# Patient Record
Sex: FEMALE | Race: OTHER | NOT HISPANIC OR LATINO | URBAN - METROPOLITAN AREA
[De-identification: names, ages, dates, MRNs, and addresses within clinical notes are randomized per-mention and may not be internally consistent; named-entity substitution may affect disease eponyms.]

---

## 2018-07-01 ENCOUNTER — OUTPATIENT (OUTPATIENT)
Dept: OUTPATIENT SERVICES | Facility: HOSPITAL | Age: 68
LOS: 1 days | End: 2018-07-01
Payer: MEDICARE

## 2018-07-10 DIAGNOSIS — Z71.89 OTHER SPECIFIED COUNSELING: ICD-10-CM

## 2018-08-07 ENCOUNTER — APPOINTMENT (OUTPATIENT)
Dept: OTOLARYNGOLOGY | Facility: CLINIC | Age: 68
End: 2018-08-07

## 2018-08-28 ENCOUNTER — APPOINTMENT (OUTPATIENT)
Dept: OTOLARYNGOLOGY | Facility: CLINIC | Age: 68
End: 2018-08-28

## 2019-01-27 ENCOUNTER — EMERGENCY (EMERGENCY)
Facility: HOSPITAL | Age: 69
LOS: 1 days | Discharge: HOME | End: 2019-01-27
Attending: EMERGENCY MEDICINE | Admitting: EMERGENCY MEDICINE
Payer: MEDICARE

## 2019-01-27 VITALS
RESPIRATION RATE: 20 BRPM | HEART RATE: 91 BPM | TEMPERATURE: 100 F | DIASTOLIC BLOOD PRESSURE: 88 MMHG | OXYGEN SATURATION: 98 % | SYSTOLIC BLOOD PRESSURE: 158 MMHG

## 2019-01-27 LAB
ANION GAP SERPL CALC-SCNC: 15 MMO/L — HIGH (ref 7–14)
BASOPHILS # BLD AUTO: 0.01 K/UL — SIGNIFICANT CHANGE UP (ref 0–0.2)
BASOPHILS NFR BLD AUTO: 0.2 % — SIGNIFICANT CHANGE UP (ref 0–2)
BUN SERPL-MCNC: 7 MG/DL — SIGNIFICANT CHANGE UP (ref 7–23)
CALCIUM SERPL-MCNC: 8.8 MG/DL — SIGNIFICANT CHANGE UP (ref 8.4–10.5)
CHLORIDE SERPL-SCNC: 97 MMOL/L — LOW (ref 98–107)
CO2 SERPL-SCNC: 23 MMOL/L — SIGNIFICANT CHANGE UP (ref 22–31)
CREAT SERPL-MCNC: 0.86 MG/DL — SIGNIFICANT CHANGE UP (ref 0.5–1.3)
EOSINOPHIL # BLD AUTO: 0 K/UL — SIGNIFICANT CHANGE UP (ref 0–0.5)
EOSINOPHIL NFR BLD AUTO: 0 % — SIGNIFICANT CHANGE UP (ref 0–6)
FLU A RESULT: DETECTED — HIGH
FLU A RESULT: DETECTED — HIGH
FLUAV AG NPH QL: DETECTED — HIGH
FLUBV AG NPH QL: NOT DETECTED — SIGNIFICANT CHANGE UP
GLUCOSE SERPL-MCNC: 168 MG/DL — HIGH (ref 70–99)
HBA1C BLD-MCNC: 6.2 % — HIGH (ref 4–5.6)
HCT VFR BLD CALC: 35.8 % — SIGNIFICANT CHANGE UP (ref 34.5–45)
HGB BLD-MCNC: 11.6 G/DL — SIGNIFICANT CHANGE UP (ref 11.5–15.5)
IMM GRANULOCYTES NFR BLD AUTO: 0.5 % — SIGNIFICANT CHANGE UP (ref 0–1.5)
LYMPHOCYTES # BLD AUTO: 0.33 K/UL — LOW (ref 1–3.3)
LYMPHOCYTES # BLD AUTO: 7.6 % — LOW (ref 13–44)
MCHC RBC-ENTMCNC: 28.2 PG — SIGNIFICANT CHANGE UP (ref 27–34)
MCHC RBC-ENTMCNC: 32.4 % — SIGNIFICANT CHANGE UP (ref 32–36)
MCV RBC AUTO: 86.9 FL — SIGNIFICANT CHANGE UP (ref 80–100)
MONOCYTES # BLD AUTO: 0.27 K/UL — SIGNIFICANT CHANGE UP (ref 0–0.9)
MONOCYTES NFR BLD AUTO: 6.2 % — SIGNIFICANT CHANGE UP (ref 2–14)
NEUTROPHILS # BLD AUTO: 3.71 K/UL — SIGNIFICANT CHANGE UP (ref 1.8–7.4)
NEUTROPHILS NFR BLD AUTO: 85.5 % — HIGH (ref 43–77)
NRBC # FLD: 0 K/UL — LOW (ref 25–125)
PLATELET # BLD AUTO: 163 K/UL — SIGNIFICANT CHANGE UP (ref 150–400)
PMV BLD: 10.4 FL — SIGNIFICANT CHANGE UP (ref 7–13)
POTASSIUM SERPL-MCNC: 4.1 MMOL/L — SIGNIFICANT CHANGE UP (ref 3.5–5.3)
POTASSIUM SERPL-SCNC: 4.1 MMOL/L — SIGNIFICANT CHANGE UP (ref 3.5–5.3)
RBC # BLD: 4.12 M/UL — SIGNIFICANT CHANGE UP (ref 3.8–5.2)
RBC # FLD: 13.9 % — SIGNIFICANT CHANGE UP (ref 10.3–14.5)
RSV RESULT: SIGNIFICANT CHANGE UP
RSV RNA RESP QL NAA+PROBE: SIGNIFICANT CHANGE UP
SODIUM SERPL-SCNC: 135 MMOL/L — SIGNIFICANT CHANGE UP (ref 135–145)
WBC # BLD: 4.34 K/UL — SIGNIFICANT CHANGE UP (ref 3.8–10.5)
WBC # FLD AUTO: 4.34 K/UL — SIGNIFICANT CHANGE UP (ref 3.8–10.5)

## 2019-01-27 PROCEDURE — 99220: CPT | Mod: GC

## 2019-01-27 PROCEDURE — 71046 X-RAY EXAM CHEST 2 VIEWS: CPT | Mod: 26

## 2019-01-27 RX ORDER — IPRATROPIUM/ALBUTEROL SULFATE 18-103MCG
3 AEROSOL WITH ADAPTER (GRAM) INHALATION
Qty: 0 | Refills: 0 | Status: COMPLETED | OUTPATIENT
Start: 2019-01-27 | End: 2019-01-27

## 2019-01-27 RX ORDER — DEXTROSE 50 % IN WATER 50 %
15 SYRINGE (ML) INTRAVENOUS ONCE
Qty: 0 | Refills: 0 | Status: DISCONTINUED | OUTPATIENT
Start: 2019-01-27 | End: 2019-01-31

## 2019-01-27 RX ORDER — SODIUM CHLORIDE 9 MG/ML
1000 INJECTION INTRAMUSCULAR; INTRAVENOUS; SUBCUTANEOUS
Qty: 0 | Refills: 0 | Status: DISCONTINUED | OUTPATIENT
Start: 2019-01-27 | End: 2019-01-31

## 2019-01-27 RX ORDER — DEXTROSE 50 % IN WATER 50 %
25 SYRINGE (ML) INTRAVENOUS ONCE
Qty: 0 | Refills: 0 | Status: DISCONTINUED | OUTPATIENT
Start: 2019-01-27 | End: 2019-01-31

## 2019-01-27 RX ORDER — IBUPROFEN 200 MG
600 TABLET ORAL EVERY 6 HOURS
Qty: 0 | Refills: 0 | Status: DISCONTINUED | OUTPATIENT
Start: 2019-01-27 | End: 2019-01-31

## 2019-01-27 RX ORDER — DEXTROSE 50 % IN WATER 50 %
12.5 SYRINGE (ML) INTRAVENOUS ONCE
Qty: 0 | Refills: 0 | Status: DISCONTINUED | OUTPATIENT
Start: 2019-01-27 | End: 2019-01-31

## 2019-01-27 RX ORDER — ATORVASTATIN CALCIUM 80 MG/1
80 TABLET, FILM COATED ORAL AT BEDTIME
Qty: 0 | Refills: 0 | Status: DISCONTINUED | OUTPATIENT
Start: 2019-01-27 | End: 2019-01-31

## 2019-01-27 RX ORDER — METOCLOPRAMIDE HCL 10 MG
10 TABLET ORAL ONCE
Qty: 0 | Refills: 0 | Status: COMPLETED | OUTPATIENT
Start: 2019-01-27 | End: 2019-01-27

## 2019-01-27 RX ORDER — SODIUM CHLORIDE 9 MG/ML
1000 INJECTION INTRAMUSCULAR; INTRAVENOUS; SUBCUTANEOUS ONCE
Qty: 0 | Refills: 0 | Status: COMPLETED | OUTPATIENT
Start: 2019-01-27 | End: 2019-01-27

## 2019-01-27 RX ORDER — SODIUM CHLORIDE 9 MG/ML
1000 INJECTION, SOLUTION INTRAVENOUS
Qty: 0 | Refills: 0 | Status: DISCONTINUED | OUTPATIENT
Start: 2019-01-27 | End: 2019-01-31

## 2019-01-27 RX ORDER — ONDANSETRON 8 MG/1
4 TABLET, FILM COATED ORAL ONCE
Qty: 0 | Refills: 0 | Status: COMPLETED | OUTPATIENT
Start: 2019-01-27 | End: 2019-01-28

## 2019-01-27 RX ORDER — LOSARTAN POTASSIUM 100 MG/1
100 TABLET, FILM COATED ORAL DAILY
Qty: 0 | Refills: 0 | Status: DISCONTINUED | OUTPATIENT
Start: 2019-01-27 | End: 2019-01-31

## 2019-01-27 RX ORDER — IPRATROPIUM/ALBUTEROL SULFATE 18-103MCG
3 AEROSOL WITH ADAPTER (GRAM) INHALATION EVERY 6 HOURS
Qty: 0 | Refills: 0 | Status: DISCONTINUED | OUTPATIENT
Start: 2019-01-27 | End: 2019-01-31

## 2019-01-27 RX ORDER — METFORMIN HYDROCHLORIDE 850 MG/1
500 TABLET ORAL
Qty: 0 | Refills: 0 | Status: DISCONTINUED | OUTPATIENT
Start: 2019-01-27 | End: 2019-01-31

## 2019-01-27 RX ORDER — ACETAMINOPHEN 500 MG
650 TABLET ORAL EVERY 6 HOURS
Qty: 0 | Refills: 0 | Status: DISCONTINUED | OUTPATIENT
Start: 2019-01-27 | End: 2019-01-31

## 2019-01-27 RX ORDER — PIOGLITAZONE HYDROCHLORIDE 15 MG/1
15 TABLET ORAL DAILY
Qty: 0 | Refills: 0 | Status: DISCONTINUED | OUTPATIENT
Start: 2019-01-27 | End: 2019-01-31

## 2019-01-27 RX ORDER — KETOROLAC TROMETHAMINE 30 MG/ML
15 SYRINGE (ML) INJECTION ONCE
Qty: 0 | Refills: 0 | Status: DISCONTINUED | OUTPATIENT
Start: 2019-01-27 | End: 2019-01-27

## 2019-01-27 RX ORDER — INSULIN GLARGINE 100 [IU]/ML
17 INJECTION, SOLUTION SUBCUTANEOUS AT BEDTIME
Qty: 0 | Refills: 0 | Status: DISCONTINUED | OUTPATIENT
Start: 2019-01-27 | End: 2019-01-27

## 2019-01-27 RX ORDER — HYDROCHLOROTHIAZIDE 25 MG
25 TABLET ORAL DAILY
Qty: 0 | Refills: 0 | Status: DISCONTINUED | OUTPATIENT
Start: 2019-01-27 | End: 2019-01-31

## 2019-01-27 RX ORDER — GLUCAGON INJECTION, SOLUTION 0.5 MG/.1ML
1 INJECTION, SOLUTION SUBCUTANEOUS ONCE
Qty: 0 | Refills: 0 | Status: DISCONTINUED | OUTPATIENT
Start: 2019-01-27 | End: 2019-01-31

## 2019-01-27 RX ADMIN — Medication 600 MILLIGRAM(S): at 20:09

## 2019-01-27 RX ADMIN — SODIUM CHLORIDE 1000 MILLILITER(S): 9 INJECTION INTRAMUSCULAR; INTRAVENOUS; SUBCUTANEOUS at 11:09

## 2019-01-27 RX ADMIN — METFORMIN HYDROCHLORIDE 500 MILLIGRAM(S): 850 TABLET ORAL at 18:58

## 2019-01-27 RX ADMIN — SODIUM CHLORIDE 100 MILLILITER(S): 9 INJECTION INTRAMUSCULAR; INTRAVENOUS; SUBCUTANEOUS at 16:18

## 2019-01-27 RX ADMIN — Medication 10 MILLIGRAM(S): at 11:09

## 2019-01-27 RX ADMIN — Medication 3 MILLILITER(S): at 11:09

## 2019-01-27 RX ADMIN — Medication 3 MILLILITER(S): at 16:18

## 2019-01-27 RX ADMIN — Medication 600 MILLIGRAM(S): at 20:39

## 2019-01-27 RX ADMIN — LOSARTAN POTASSIUM 100 MILLIGRAM(S): 100 TABLET, FILM COATED ORAL at 18:58

## 2019-01-27 RX ADMIN — Medication 650 MILLIGRAM(S): at 15:06

## 2019-01-27 RX ADMIN — Medication 15 MILLIGRAM(S): at 11:10

## 2019-01-27 RX ADMIN — Medication 75 MILLIGRAM(S): at 15:07

## 2019-01-27 RX ADMIN — Medication 40 MILLIGRAM(S): at 11:09

## 2019-01-27 NOTE — ED ADULT TRIAGE NOTE - CHIEF COMPLAINT QUOTE
pt here for subjective fevers, headache, chills, cough, and body aches that started last night. pmhx: hypothyroid, hld, htn, dm

## 2019-01-27 NOTE — ED CDU PROVIDER INITIAL DAY NOTE - OBJECTIVE STATEMENT
Pt is a 69 y/o F nonsmoker PMHx HTN, HLD, DM, Asthma (no h/o intubations) p/w body aches, chills, cough since yesterday.  Pt notes gradual onset generalized body aches, chills and moderate generalized headache associated with cough producing yellow sputum.  Pt also note associated wheezing and mild SOB, which improves with inhaler.  Pt notes receiving flu vaccines for this flu season.  Pt notes associated chest tightness similar to asthma exacerbation in past.  Pt denies any numbness, weakness, neck stiffness, neck pain, head trauma, known sick contacts, diarrhea, abdominal pain, dysuria, cloudy urine, or any other specific complaints.  In ED, pt found to be flu+ with normal cxr.  Pt sent to CDU for duonebs and supportive care.  Pt notes symptoms improved after receiving toradol, reglan, duoneb and IV fluids.

## 2019-01-27 NOTE — ED CDU PROVIDER INITIAL DAY NOTE - ATTENDING CONTRIBUTION TO CARE
cherrie: pmh includes DM.  yesterday developed fevers, back pain and headaches, followed by weakness, wheezing and nausea. came to ED where pt was found to have the flu. Given nebulizers; post BD rx [id number] (approx 55% predicted). pt has white yellow sputum with occasional spots of blood.   exam: NAD. rr16. no diff speaking. neck supple. lungs occasional wheeze. exam otherwise unremarkable.   impression: FLu with new wheezing.  recc: continue BD rx; tamiflu; rx nausea; hydrate; observe.

## 2019-01-27 NOTE — ED ADULT NURSE NOTE - OBJECTIVE STATEMENT
Pt reports flu-like symptoms, including cough, generalized pain/weakness, headache and nausea since yesterday. Patient reports hx of HTN, HLD and hypothyroidism. Patient reports subjective fevers, afebrile in triage. Patient's breathing spontaneous and unlabored. Patient's labs drawn, flu swab performed, #20g placed in left AC. Patient received Reglan, Toradol IV and IVF infusing w/o difficulty. Patient given prednisone and duoneb treatment. Patient pending reassessment and further evaluation, XR.

## 2019-01-27 NOTE — ED PROVIDER NOTE - PROGRESS NOTE DETAILS
MD CHO:  Flu positive, discussed result with pt.  Pt states she does not feel much improved, feels sob while ambulating.  Will obs for continued bronchodilator therapy and supportive care for flu.

## 2019-01-27 NOTE — ED CDU PROVIDER INITIAL DAY NOTE - MEDICAL DECISION MAKING DETAILS
Pt is a 69 y/o F nonsmoker PMHx HTN, HLD, DM, Asthma (no h/o intubations) p/w body aches, chills, cough since yesterday -- influenza, asthma exacerbation -- duonebs, supportive care

## 2019-01-27 NOTE — ED PROVIDER NOTE - MEDICAL DECISION MAKING DETAILS
67 y/o female h/o asthma with flu like sx since last night with asthma exac.  Obtain cbc, bmp, flu swab, cxr, give ivf bolus, nsaids, duonebs, steroid, reassess.

## 2019-01-27 NOTE — ED ADULT NURSE NOTE - NSIMPLEMENTINTERV_GEN_ALL_ED
Implemented All Universal Safety Interventions:  [place] to call system. Call bell, personal items and telephone within reach. Instruct patient to call for assistance. Room bathroom lighting operational. Non-slip footwear when patient is off stretcher. Physically safe environment: no spills, clutter or unnecessary equipment. Stretcher in lowest position, wheels locked, appropriate side rails in place.

## 2019-01-27 NOTE — ED PROVIDER NOTE - CONSTITUTIONAL, MLM
normal... Well appearing, well nourished, awake, alert, oriented to person, place, time/situation and appears fatigued

## 2019-01-27 NOTE — ED PROVIDER NOTE - OBJECTIVE STATEMENT
67 y/o female with h/o asthma, hld, htn, dm, hypothyroidism here with cough, fever, ha, myalgias since last night.  Has had flu vacc this year, no known sick contacts or recent travel.  Took symbicort at home with some relief.

## 2019-01-28 VITALS
SYSTOLIC BLOOD PRESSURE: 110 MMHG | TEMPERATURE: 97 F | RESPIRATION RATE: 16 BRPM | HEART RATE: 93 BPM | OXYGEN SATURATION: 96 % | DIASTOLIC BLOOD PRESSURE: 58 MMHG

## 2019-01-28 PROCEDURE — 99217: CPT | Mod: GC

## 2019-01-28 RX ORDER — ALBUTEROL 90 UG/1
2 AEROSOL, METERED ORAL EVERY 6 HOURS
Qty: 0 | Refills: 0 | Status: DISCONTINUED | OUTPATIENT
Start: 2019-01-28 | End: 2019-01-31

## 2019-01-28 RX ORDER — ONDANSETRON 8 MG/1
1 TABLET, FILM COATED ORAL
Qty: 12 | Refills: 0 | OUTPATIENT
Start: 2019-01-28

## 2019-01-28 RX ADMIN — Medication 3 MILLILITER(S): at 09:00

## 2019-01-28 RX ADMIN — Medication 40 MILLIGRAM(S): at 06:00

## 2019-01-28 RX ADMIN — Medication 75 MILLIGRAM(S): at 06:00

## 2019-01-28 RX ADMIN — ONDANSETRON 4 MILLIGRAM(S): 8 TABLET, FILM COATED ORAL at 00:03

## 2019-01-28 RX ADMIN — ATORVASTATIN CALCIUM 80 MILLIGRAM(S): 80 TABLET, FILM COATED ORAL at 00:02

## 2019-01-28 RX ADMIN — METFORMIN HYDROCHLORIDE 500 MILLIGRAM(S): 850 TABLET ORAL at 09:58

## 2019-01-28 RX ADMIN — LOSARTAN POTASSIUM 100 MILLIGRAM(S): 100 TABLET, FILM COATED ORAL at 09:58

## 2019-01-28 RX ADMIN — Medication 25 MILLIGRAM(S): at 09:58

## 2019-01-28 NOTE — ED CDU PROVIDER DISPOSITION NOTE - NSFOLLOWUPINSTRUCTIONS_ED_ALL_ED_FT
Follow up with your Doctor in 1-2 days.  Rest.  Drink plenty of fluids.  Take Tylenol 650mg orally every 6 hours as needed for fever/pain.  Take Tamiflu 75mg orally 2 x a day for the next 4 days.   Zofran 4mg Oral dissolving tablet every 8 hours as needed for nausea.    Return to the ER for any persistent/worsening or new symptoms weakness, dizziness, chest pain, shortness of breath, abdominal pain or any concerning symptoms. Follow up with your Doctor in 1-2 days.  Rest.  Drink plenty of fluids.  Take Tylenol 650mg orally every 6 hours as needed for fever/pain.  Take Tamiflu 75mg orally 2 x a day for the next 4 days.   Zofran 4mg Oral dissolving tablet every 8 hours as needed for nausea.    Take Prednisone 20mg orally once a day for the next 4 days start tomorrow 1/29.  Albuterol inhaler 2 puff every 4-6 hours as needed for wheeze and cough.   Return to the ER for any persistent/worsening or new symptoms weakness, dizziness, chest pain, shortness of breath, abdominal pain or any concerning symptoms.

## 2019-01-28 NOTE — ED CDU PROVIDER SUBSEQUENT DAY NOTE - HISTORY
CDU Initial Day Note: Pt is a 67 y/o F nonsmoker PMHx HTN, HLD, DM, Asthma (no h/o intubations) p/w body aches, chills, cough since yesterday.  Pt notes gradual onset generalized body aches, chills and moderate generalized headache associated with cough producing yellow sputum.  Pt also note associated wheezing and mild SOB, which improves with inhaler.  Pt notes receiving flu vaccines for this flu season.  Pt notes associated chest tightness similar to asthma exacerbation in past.  Pt denies any numbness, weakness, neck stiffness, neck pain, head trauma, known sick contacts, diarrhea, abdominal pain, dysuria, cloudy urine, or any other specific complaints.  In ED, pt found to be flu+ with normal cxr.  Pt sent to CDU for duonebs and supportive care.  Pt notes symptoms improved after receiving toradol, reglan, duoneb and IV fluids.    CDU TALI Vega: Agree with above. Pt is a 67 y/o F w/ hx asthma, dm, HTN, HLD, here w/ wheezing/shortness of breath 2/2 influenza. Pt was reevaluated throughout the night, no wheezing on repeat lung exams. Has been tolerating PO, although had an episode of N/V after taking Tamiflu last night. Pt has also been able to walk around the unit, to the restroom without observed dyspnea. Plan for reassessment this morning by day team and probable discharge w/close PMD follow up.

## 2019-01-28 NOTE — ED CDU PROVIDER DISPOSITION NOTE - ATTENDING CONTRIBUTION TO CARE
I performed a face to face bedside interview with patient regarding history of present illness, review of symptoms and past medical history. I completed an independent physical exam.  I have discussed patient's plan of care with PA.   I agree with note as stated above, having amended the EMR as needed to reflect my findings. I have discussed the assessment and plan of care.  This includes during the time I functioned as the attending physician for this patient.

## 2019-01-28 NOTE — ED CDU PROVIDER DISPOSITION NOTE - CLINICAL COURSE
Patient was brought to CDU for respiratory treatments and monitoring.  Patient improved and was stable for discharge the following day.

## 2019-01-28 NOTE — ED CDU PROVIDER SUBSEQUENT DAY NOTE - ATTENDING CONTRIBUTION TO CARE
I performed a face to face bedside interview with patient regarding history of present illness, review of symptoms and past medical history. I completed an independent physical exam.  I have discussed patient's plan of care with PA.   I agree with note as stated above, having amended the EMR as needed to reflect my findings. I have discussed the assessment and plan of care.  This includes during the time I functioned as the attending physician for this patient.    Attending Exam - Dr. Zhang: GEN: well appearing, NAD  HEENT: +PERRL, EOMI  RESP: CTAB, no signs of respiratory distress CV: s1s2 RRR ABD: soft/non tender/non distended  MSK: no deformities / swelling, normal range of motion, spine grossly normal NEURO: alert, non focal exam SKIN: normal color / temperature / condition.

## 2019-01-28 NOTE — ED CDU PROVIDER SUBSEQUENT DAY NOTE - PROGRESS NOTE DETAILS
TALI Estrada: pt feels better ambulating without difficulty.  Pt tolerating PO.  Lungs CTA b/l.  Results reviewed with patient.  Discharge reviewed and discussed with patient.

## 2019-01-28 NOTE — ED CDU PROVIDER SUBSEQUENT DAY NOTE - MEDICAL DECISION MAKING DETAILS
67 y/o F w/ asthma exacerbation 2/2 influenza, improving clinically sp duonebs and steroids, plan for continue supportive care today and probable discharge

## 2019-10-01 PROCEDURE — G9005: CPT

## 2021-02-03 ENCOUNTER — EMERGENCY (EMERGENCY)
Facility: HOSPITAL | Age: 71
LOS: 1 days | Discharge: LEFT BEFORE DISCHARGE | End: 2021-02-03
Attending: EMERGENCY MEDICINE | Admitting: EMERGENCY MEDICINE
Payer: MEDICARE

## 2021-02-03 VITALS
OXYGEN SATURATION: 100 % | HEART RATE: 59 BPM | DIASTOLIC BLOOD PRESSURE: 67 MMHG | RESPIRATION RATE: 16 BRPM | HEIGHT: 65 IN | TEMPERATURE: 97 F | SYSTOLIC BLOOD PRESSURE: 124 MMHG

## 2021-02-03 PROCEDURE — L9991: CPT

## 2021-02-03 NOTE — ED ADULT TRIAGE NOTE - CHIEF COMPLAINT QUOTE
Pt c/o "feeling feverish", body ache, headache, dizziness, back pain x 2 weeks.  Denies N/V/D, chest pain

## 2021-02-03 NOTE — ED PROVIDER NOTE - PROGRESS NOTE DETAILS
Patient and daughter interviewed separately. Patient's daughter after exam wanted to take patient to other hospital because of visitation restrictions required by institution for COVID; feels "we are trying to make her a COVID patiient". PE findings discussed with patient's permission and emphasis placed on possibility of false negatives and likelihood of either infections (viral; COVID or other)/sepsis, malignancy, autoimmune causes of what appears to be liver failure and the need to make a rapid diagnosis although at the time of eval she appears clinically stable.; Patient (who is as per the daughter "forgetful" but appears to have insight and decision making at time of assessment) agreed with  her daughter and still insisted on leaving at the time I left her to discuss matters with her daughter and after  I emphasized the need for workup whether they stay or not.  Patient eloped from room with daughter while I was in the course of caring for other patients

## 2021-02-03 NOTE — ED PROVIDER NOTE - CLINICAL SUMMARY MEDICAL DECISION MAKING FREE TEXT BOX
Pt c/o "feeling feverish", body ache, headache, dizziness, back pain x 2 weeks.  Denies N/V/D, chest pain PAST MEDICAL & SURGICAL HISTORY:  Unspecified Hypothyroidism    Dyslipidemia    Essential Hypertension    Diabetes    H/O: Hysterectomy 70F who has a past medical history of Hypothyroidism Dyslipidemia Essential Hypertension Diabetes and Hysterectomy PTED with complaints of Pt c/o "feeling feverish", with abdominal pain body aches, headache, dizziness, back pain progressing over the past 2 weeks.  Denies N/V/D, chest pain Subjective complaint of jaundice is what prompted ED visit today when noted by family; patients primary compllaint is abdominal pain

## 2021-02-03 NOTE — ED PROVIDER NOTE - OBJECTIVE STATEMENT
70F who has a past medical history of Hypothyroidism Dyslipidemia Essential Hypertension Diabetes and Hysterectomy PTED with complaints of Pt c/o "feeling feverish", with abdominal pain body aches, headache, dizziness, back pain progressing over the past 2 weeks.  Denies N/V/D, chest pain Subjective complaint of jaundice is what prompted ED visit today when noted by family; patients primary compllaint is abdominal pain

## 2022-09-29 NOTE — ED CDU PROVIDER INITIAL DAY NOTE - MUSCULOSKELETAL, MLM
pregabalin (LYRICA) 150 MG Last renewed on 9/29/22 for 30 day supply with 3 refill(s)  Refill request denied, not due for renewal at this time. Pt to call the pharmacy to request refills. Refill(s) remaining on file.     Spine appears normal, range of motion is not limited, no muscle or joint tenderness